# Patient Record
Sex: MALE | Race: WHITE
[De-identification: names, ages, dates, MRNs, and addresses within clinical notes are randomized per-mention and may not be internally consistent; named-entity substitution may affect disease eponyms.]

---

## 2019-01-06 ENCOUNTER — HOSPITAL ENCOUNTER (EMERGENCY)
Dept: HOSPITAL 12 - ER | Age: 74
Discharge: HOME | End: 2019-01-06
Payer: MEDICARE

## 2019-01-06 VITALS — SYSTOLIC BLOOD PRESSURE: 111 MMHG | DIASTOLIC BLOOD PRESSURE: 56 MMHG

## 2019-01-06 VITALS — WEIGHT: 137 LBS | HEIGHT: 67 IN | BODY MASS INDEX: 21.5 KG/M2
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
## 2019-01-06 DIAGNOSIS — E78.5: ICD-10-CM
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
## 2019-01-06 DIAGNOSIS — S01.01XA: Primary | ICD-10-CM

## 2019-01-06 PROCEDURE — A4663 DIALYSIS BLOOD PRESSURE CUFF: HCPCS

## 2019-01-06 PROCEDURE — A4217 STERILE WATER/SALINE, 500 ML: HCPCS

## 2019-01-06 NOTE — NUR
Patient discharged to home in stable conditon.  Written and verbal after care 
instructions given. 

Patient verbalizes understanding of instructions. Patient ambulated out of ER 
with steady gait, no acute signs of distress, VSS, all belongings taken, 
accompanied by family, to be driven home via private vehicle by family.

## 2019-01-12 ENCOUNTER — HOSPITAL ENCOUNTER (EMERGENCY)
Dept: HOSPITAL 12 - ER | Age: 74
Discharge: HOME | End: 2019-01-12
Payer: MEDICARE

## 2019-01-12 VITALS — HEIGHT: 67 IN | WEIGHT: 160 LBS | BODY MASS INDEX: 25.11 KG/M2
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
## 2019-01-12 DIAGNOSIS — S01.01XD: Primary | ICD-10-CM

## 2019-01-12 DIAGNOSIS — E78.5: ICD-10-CM

## 2019-01-12 PROCEDURE — A4663 DIALYSIS BLOOD PRESSURE CUFF: HCPCS

## 2019-01-12 NOTE — NUR
Patient discharged to home in stable conditon & brisk steady gait.  Written and 
verbal after care instructions given to patient and family. Patient and family 
verbalized understanding of instructions.

## 2019-01-16 ENCOUNTER — HOSPITAL ENCOUNTER (EMERGENCY)
Dept: HOSPITAL 12 - ER | Age: 74
Discharge: HOME | End: 2019-01-16
Payer: MEDICARE

## 2019-01-16 VITALS — SYSTOLIC BLOOD PRESSURE: 128 MMHG | DIASTOLIC BLOOD PRESSURE: 82 MMHG

## 2019-01-16 VITALS — BODY MASS INDEX: 22.33 KG/M2 | WEIGHT: 134 LBS | HEIGHT: 65 IN

## 2019-01-16 DIAGNOSIS — E78.5: ICD-10-CM
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
## 2019-01-16 DIAGNOSIS — S01.01XD: Primary | ICD-10-CM

## 2019-01-16 PROCEDURE — A4663 DIALYSIS BLOOD PRESSURE CUFF: HCPCS

## 2022-07-02 ENCOUNTER — HOSPITAL ENCOUNTER (EMERGENCY)
Dept: HOSPITAL 12 - ER | Age: 77
Discharge: HOME | End: 2022-07-02
Payer: MEDICARE

## 2022-07-02 VITALS — DIASTOLIC BLOOD PRESSURE: 54 MMHG | SYSTOLIC BLOOD PRESSURE: 117 MMHG

## 2022-07-02 VITALS — HEIGHT: 63 IN | BODY MASS INDEX: 24.45 KG/M2 | WEIGHT: 138 LBS

## 2022-07-02 DIAGNOSIS — S22.31XA: Primary | ICD-10-CM

## 2022-07-02 DIAGNOSIS — E11.9: ICD-10-CM

## 2022-07-02 DIAGNOSIS — E78.5: ICD-10-CM
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
## 2022-07-02 PROCEDURE — A4663 DIALYSIS BLOOD PRESSURE CUFF: HCPCS
